# Patient Record
Sex: FEMALE | Race: WHITE | URBAN - NONMETROPOLITAN AREA
[De-identification: names, ages, dates, MRNs, and addresses within clinical notes are randomized per-mention and may not be internally consistent; named-entity substitution may affect disease eponyms.]

---

## 2019-10-29 ENCOUNTER — DOCTOR'S OFFICE (OUTPATIENT)
Dept: URBAN - NONMETROPOLITAN AREA CLINIC 1 | Facility: CLINIC | Age: 59
Setting detail: OPHTHALMOLOGY
End: 2019-10-29
Payer: COMMERCIAL

## 2019-10-29 DIAGNOSIS — H52.13: ICD-10-CM

## 2019-10-29 DIAGNOSIS — H52.4: ICD-10-CM

## 2019-10-29 PROBLEM — Z01.00 GOOD OCULAR HEALTH OU: Status: ACTIVE | Noted: 2019-10-29

## 2019-10-29 PROCEDURE — 92004 COMPRE OPH EXAM NEW PT 1/>: CPT | Performed by: OPTOMETRIST

## 2019-10-29 ASSESSMENT — REFRACTION_CURRENTRX
OD_VPRISM_DIRECTION: PROGS
OD_OVR_VA: 20/
OD_ADD: +2.00
OS_ADD: +2.00
OS_OVR_VA: 20/
OS_AXIS: 125
OS_SPHERE: -0.75
OS_OVR_VA: 20/
OS_VPRISM_DIRECTION: PROGS
OD_AXIS: 41
OD_OVR_VA: 20/
OD_SPHERE: -2.00
OS_CYLINDER: -1.00
OD_OVR_VA: 20/
OD_CYLINDER: -0.25
OS_OVR_VA: 20/

## 2019-10-29 ASSESSMENT — REFRACTION_MANIFEST
OD_VA1: 20/
OD_VA2: 20/
OD_VA1: 20/20-2
OD_SPHERE: -2.00
OS_AXIS: 120
OS_VA1: 20/
OS_CYLINDER: -0.50
OS_VA2: 20/
OU_VA: 20/
OS_VA1: 20/20-2
OD_CYLINDER: SPH
OS_VA3: 20/
OU_VA: 20/
OD_ADD: +2.50
OD_VA3: 20/
OS_VA2: 20/20-2
OS_SPHERE: -1.50
OD_VA2: 20/20-2
OS_ADD: +2.50
OS_VA3: 20/
OD_VA3: 20/

## 2019-10-29 ASSESSMENT — SPHEQUIV_DERIVED
OS_SPHEQUIV: -1.25
OS_SPHEQUIV: -1.75
OD_SPHEQUIV: -1.75

## 2019-10-29 ASSESSMENT — REFRACTION_AUTOREFRACTION
OS_CYLINDER: -0.50
OS_AXIS: 118
OD_CYLINDER: 0.00
OS_SPHERE: -1.00
OD_AXIS: 180
OD_SPHERE: -1.75

## 2019-10-29 ASSESSMENT — VISUAL ACUITY
OS_BCVA: 20/20-1
OD_BCVA: 20/20

## 2019-10-29 ASSESSMENT — CONFRONTATIONAL VISUAL FIELD TEST (CVF)
OD_FINDINGS: FULL
OS_FINDINGS: FULL

## 2023-11-01 ENCOUNTER — TELEPHONE (OUTPATIENT)
Age: 63
End: 2023-11-01

## 2023-11-01 NOTE — TELEPHONE ENCOUNTER
Patient called not sure she wants to keep her appt with Sondra Abdullahi as she hasn't decided what avenue she wants to take and she doesn't even want anything until after the holidays. She asked me to cxl her apt and she will call back after she give it some thought.

## 2024-12-04 DIAGNOSIS — N28.1 CYST OF KIDNEY, ACQUIRED: ICD-10-CM

## 2024-12-11 ENCOUNTER — HOSPITAL ENCOUNTER (OUTPATIENT)
Dept: MRI IMAGING | Facility: HOSPITAL | Age: 64
Discharge: HOME | End: 2024-12-11
Payer: COMMERCIAL

## 2024-12-11 DIAGNOSIS — N28.1 CYST OF KIDNEY, ACQUIRED: ICD-10-CM

## 2024-12-11 PROCEDURE — A9585 GADOBUTROL INJECTION: HCPCS | Performed by: RADIOLOGY

## 2024-12-11 PROCEDURE — 74183 MRI ABD W/O CNTR FLWD CNTR: CPT

## 2024-12-11 RX ORDER — GADOBUTROL 604.72 MG/ML
10 INJECTION INTRAVENOUS
Status: COMPLETED | OUTPATIENT
Start: 2024-12-11 | End: 2024-12-11

## 2024-12-11 RX ADMIN — GADOBUTROL 10 ML: 604.72 INJECTION INTRAVENOUS at 12:37
